# Patient Record
Sex: FEMALE | Race: WHITE
[De-identification: names, ages, dates, MRNs, and addresses within clinical notes are randomized per-mention and may not be internally consistent; named-entity substitution may affect disease eponyms.]

---

## 2019-10-30 ENCOUNTER — HOSPITAL ENCOUNTER (EMERGENCY)
Dept: HOSPITAL 46 - ED | Age: 49
Discharge: HOME | End: 2019-10-30
Payer: COMMERCIAL

## 2019-10-30 VITALS — BODY MASS INDEX: 32.96 KG/M2 | HEIGHT: 67 IN | WEIGHT: 209.99 LBS

## 2019-10-30 DIAGNOSIS — Z79.899: ICD-10-CM

## 2019-10-30 DIAGNOSIS — F17.200: ICD-10-CM

## 2019-10-30 DIAGNOSIS — Z88.1: ICD-10-CM

## 2019-10-30 DIAGNOSIS — Z79.82: ICD-10-CM

## 2019-10-30 DIAGNOSIS — I10: ICD-10-CM

## 2019-10-30 DIAGNOSIS — Z88.0: ICD-10-CM

## 2019-10-30 DIAGNOSIS — T78.3XXA: Primary | ICD-10-CM

## 2019-10-30 NOTE — XMS
Clinical Summary
  Created on: 10/30/2019
 
 Reema Lugo
 External Reference #: [id number]
 : [birth year]
 Sex: Female
 
 Demographics
 
 
+-----------------------+----------------------+
| Address               | [address] [place] [address] Rd     |
|                       | [place]  [address] |
+-----------------------+----------------------+
| Home Phone            | [phone]      |
+-----------------------+----------------------+
| Preferred Language    | Unknown              |
+-----------------------+----------------------+
| Marital Status        | Single               |
+-----------------------+----------------------+
| Oriental orthodox Affiliation | Unknown              |
+-----------------------+----------------------+
| Race                  | Unknown              |
+-----------------------+----------------------+
| Ethnic Group          | Unknown              |
+-----------------------+----------------------+
 
 
 Author
 
 
+--------------+--------------------------------------------+
| Author       | [facility]  |
|              | and [facility]                                |
+--------------+--------------------------------------------+
| Organization | [facility]  |
|              | and [place]                                |
+--------------+--------------------------------------------+
| Address      | Unknown                                    |
+--------------+--------------------------------------------+
| Phone        | Unavailable                                |
+--------------+--------------------------------------------+
 
 
 
 Support
 
 
+--------------+--------------+---------+-----------------+
| Name         | Relationship | Address | Phone           |
+--------------+--------------+---------+-----------------+
| CAST,ANYLA   | ECON         | Unknown | [phone] |
+--------------+--------------+---------+-----------------+
| JESSICA ROSENBERG | ECON         | Unknown | [phone] |
+--------------+--------------+---------+-----------------+
 
 
 
 
 Care Team Providers
 
 
+-----------------------------+------+-----------------+
| Care Team Member Name       | Role | Phone           |
+-----------------------------+------+-----------------+
| Kathy Lopez | PCP  | [phone] |
|  PA-C                       |      |                 |
+-----------------------------+------+-----------------+
 
 
 
 Allergies
 Not on File
 
 Medications
 Not on file
 
 Active Problems
 Not on file
 
 Social History
 
 
+----------------+-------+-----------+--------+------+
| Tobacco Use    | Types | Packs/Day | Years  | Date |
|                |       |           | Used   |      |
+----------------+-------+-----------+--------+------+
| Never Assessed |       |           |        |      |
+----------------+-------+-----------+--------+------+
 
 
 
+------------------+---------------+
| Sex Assigned at  | Date Recorded |
| Birth            |               |
+------------------+---------------+
| Not on file      |               |
+------------------+---------------+
 
 
 
+----------------+-------------+-------------+
| Job Start Date | Occupation  | Industry    |
+----------------+-------------+-------------+
| Not on file    | Not on file | Not on file |
+----------------+-------------+-------------+
 
 
 
+----------------+--------------+------------+
| Travel History | Travel Start | Travel End |
+----------------+--------------+------------+
 
 
 
+-------------------------------------+
| No recent travel history available. |
+-------------------------------------+
 
 
 
 
 Last Filed Vital Signs
 Not on file
 
 Plan of Treatment
 
 
+--------+---------+----------------+---------------------+-------------+
| Date   | Type    | Specialty      | Care Team           | Description |
+--------+---------+----------------+---------------------+-------------+
| / | Office  | Sleep Medicine |   Nathan Velarde  |             |
|    | Visit   |                | MD Thomas  [address]   |             |
|        |         |                | [address]  [address]    |             |
|        |         |                | [place] [address]     |             |
|        |         |                | [phone]        |             |
|        |         |                | [phone] (Fax)  |             |
+--------+---------+----------------+---------------------+-------------+
 
 
 
+---------------------+-----------+-----------+----------+
| Health Maintenance  | Due Date  | Last Done | Comments |
+---------------------+-----------+-----------+----------+
| Vaccine:            | 11/10/199 |           |          |
| Dtap/Tdap/Td (1 -   | 1         |           |          |
| Tdap)               |           |           |          |
+---------------------+-----------+-----------+----------+
| Cervical Cancer     | 11/10/200 |           |          |
| Screening (Pap)     | 2         |           |          |
+---------------------+-----------+-----------+----------+
| Breast Cancer       | 11/10/201 |           |          |
| Screening           | 7         |           |          |
+---------------------+-----------+-----------+----------+
| Vaccine: Influenza  |  |           |          |
| (#1)                | 9         |           |          |
+---------------------+-----------+-----------+----------+
 
 
 
 Results
 Not on filefrom Last 3 Months
 
 Insurance
 
 
+--------------------+--------+-------------+--------+-------------+---------+------+
| Payer              | Benefi | Subscriber  | Effect | Phone       | Address | Type |
|                    | t Plan | ID          | oli    |             |         |      |
|                    |  /     |             | Dates  |             |         |      |
|                    | Group  |             |        |             |         |      |
+--------------------+--------+-------------+--------+-------------+---------+------+
| [facility]  | [facility]    | [id number] | 20 | [phone] |         | PPO  |
| PLAN               | PERSON |             | 19-Pre | 5           |         |      |
|                    | [place]     |             | sent   |             |         |      |
|                    | OPEN   |             |        |             |         |      |
|                    | OPTION |             |        |             |         |      |
+--------------------+--------+-------------+--------+-------------+---------+------+
 
 
 
 
+-------------------+--------+-------------+--------+-------------+----------------------+
| Guarantor Name    | Accoun | Relation to | Date   | Phone       | Billing Address      |
|                   | t Type |  Patient    | of     |             |                      |
|                   |        |             | Birth  |             |                      |
+-------------------+--------+-------------+--------+-------------+----------------------+
| Reema Lugo | Person | Self        | [birth year]             |   [address]   |
|                   | al/Fam |             | 1970   | [phone] | [place] [address]  |
|                   | [place]    |             |        | 3 (Home)    |                      |
|                   |        |             |        | [phone] |                      |
|                   |        |             |        | 3 (Work)    |                      |
+-------------------+--------+-------------+--------+-------------+----------------------+
 
 
 
 Advance Directives
 Patient has advance care planning documents on file. For more information, please contact:[facility] [place] [address]
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
## 2019-10-30 NOTE — XMS
Clinical Summary
  Created on: 10/30/2019
 
 Reema Lugo
 External Reference #: [id number]
 : [birth year]
 Sex: Female
 
 Demographics
 
 
+-----------------------+----------------------+
| Address               | [address] [place] [address] Rd     |
|                       | [place]  [address] |
+-----------------------+----------------------+
| Home Phone            | [phone]      |
+-----------------------+----------------------+
| Preferred Language    | Unknown              |
+-----------------------+----------------------+
| Marital Status        | Single               |
+-----------------------+----------------------+
| Jewish Affiliation | Unknown              |
+-----------------------+----------------------+
| Race                  | Unknown              |
+-----------------------+----------------------+
| Ethnic Group          | Unknown              |
+-----------------------+----------------------+
 
 
 Author
 
 
+--------------+--------------------------------------------+
| Author       | [facility] [place]  |
|              | and [place]                                |
+--------------+--------------------------------------------+
| Organization | [facility]  |
|              | and [place]                                |
+--------------+--------------------------------------------+
| Address      | Unknown                                    |
+--------------+--------------------------------------------+
| Phone        | Unavailable                                |
+--------------+--------------------------------------------+
 
 
 
 Support
 
 
+--------------+--------------+---------+-----------------+
| Name         | Relationship | Address | Phone           |
+--------------+--------------+---------+-----------------+
| CAST,ANYLA   | ECON         | Unknown | [phone] |
+--------------+--------------+---------+-----------------+
| JESSICA ROSENBERG | ECON         | Unknown | [phone] |
+--------------+--------------+---------+-----------------+
 
 
 
 
 Care Team Providers
 
 
+-----------------------------+------+-----------------+
| Care Team Member Name       | Role | Phone           |
+-----------------------------+------+-----------------+
| Kathy Lopez | PCP  | [phone] |
|  PA-C                       |      |                 |
+-----------------------------+------+-----------------+
 
 
 
 Allergies
 Not on File
 
 Medications
 Not on file
 
 Active Problems
 Not on file
 
 Social History
 
 
+----------------+-------+-----------+--------+------+
| Tobacco Use    | Types | Packs/Day | Years  | Date |
|                |       |           | Used   |      |
+----------------+-------+-----------+--------+------+
| Never Assessed |       |           |        |      |
+----------------+-------+-----------+--------+------+
 
 
 
+------------------+---------------+
| Sex Assigned at  | Date Recorded |
| Birth            |               |
+------------------+---------------+
| Not on file      |               |
+------------------+---------------+
 
 
 
+----------------+-------------+-------------+
| Job Start Date | Occupation  | Industry    |
+----------------+-------------+-------------+
| Not on file    | Not on file | Not on file |
+----------------+-------------+-------------+
 
 
 
+----------------+--------------+------------+
| Travel History | Travel Start | Travel End |
+----------------+--------------+------------+
 
 
 
+-------------------------------------+
| No recent travel history available. |
+-------------------------------------+
 
 
 
 
 Last Filed Vital Signs
 Not on file
 
 Plan of Treatment
 
 
+--------+---------+----------------+---------------------+-------------+
| Date   | Type    | Specialty      | Care Team           | Description |
+--------+---------+----------------+---------------------+-------------+
| / | Office  | Sleep Medicine |   Nathan Velarde  |             |
|    | Visit   |                | MD Thomas  [address]   |             |
|        |         |                | [address]  [address]    |             |
|        |         |                | [place] [address]     |             |
|        |         |                | [phone]        |             |
|        |         |                | [phone] (Fax)  |             |
+--------+---------+----------------+---------------------+-------------+
 
 
 
+---------------------+-----------+-----------+----------+
| Health Maintenance  | Due Date  | Last Done | Comments |
+---------------------+-----------+-----------+----------+
| Vaccine:            | 11/10/199 |           |          |
| Dtap/Tdap/Td (1 -   | 1         |           |          |
| Tdap)               |           |           |          |
+---------------------+-----------+-----------+----------+
| Cervical Cancer     | 11/10/200 |           |          |
| Screening (Pap)     | 2         |           |          |
+---------------------+-----------+-----------+----------+
| Breast Cancer       | 11/10/201 |           |          |
| Screening           | 7         |           |          |
+---------------------+-----------+-----------+----------+
| Vaccine: Influenza  |  |           |          |
| (#1)                | 9         |           |          |
+---------------------+-----------+-----------+----------+
 
 
 
 Results
 Not on filefrom Last 3 Months
 
 Insurance
 
 
+--------------------+--------+-------------+--------+-------------+---------+------+
| Payer              | Benefi | Subscriber  | Effect | Phone       | Address | Type |
|                    | t Plan | ID          | oli    |             |         |      |
|                    |  /     |             | Dates  |             |         |      |
|                    | Group  |             |        |             |         |      |
+--------------------+--------+-------------+--------+-------------+---------+------+
| [facility]  | [facility] [place]    | [id number] | 20 | [phone] |         | PPO  |
| PLAN               | PERSON |             | 19-Pre | 5           |         |      |
|                    | AL     |             | sent   |             |         |      |
|                    | OPEN   |             |        |             |         |      |
|                    | [employer]ION |             |        |             |         |      |
+--------------------+--------+-------------+--------+-------------+---------+------+
 
 
 
 
+-------------------+--------+-------------+--------+-------------+----------------------+
| Guarantor Name    | Accoun | Relation to | Date   | Phone       | Billing Address      |
|                   | t Type |  Patient    | of     |             |                      |
|                   |        |             | Birth  |             |                      |
+-------------------+--------+-------------+--------+-------------+----------------------+
| Reema Lugo | Person | Self        | [birth year]             |   [address]   |
|                   | al/Fam |             | 1970   | [phone] | [place] [address]  |
|                   | [place]    |             |        | 3 (Home)    |                      |
|                   |        |             |        | [phone] |                      |
|                   |        |             |        | 3 (Work)    |                      |
+-------------------+--------+-------------+--------+-------------+----------------------+
 
 
 
 Advance Directives
 Patient has advance care planning documents on file. For more information, please contact:[facility] [place] [address]